# Patient Record
Sex: MALE | Race: WHITE | NOT HISPANIC OR LATINO | Employment: UNEMPLOYED | URBAN - METROPOLITAN AREA
[De-identification: names, ages, dates, MRNs, and addresses within clinical notes are randomized per-mention and may not be internally consistent; named-entity substitution may affect disease eponyms.]

---

## 2018-08-09 ENCOUNTER — APPOINTMENT (OUTPATIENT)
Dept: RADIOLOGY | Facility: MEDICAL CENTER | Age: 38
End: 2018-08-09
Attending: EMERGENCY MEDICINE
Payer: MEDICAID

## 2018-08-09 ENCOUNTER — HOSPITAL ENCOUNTER (EMERGENCY)
Facility: MEDICAL CENTER | Age: 38
End: 2018-08-09
Attending: EMERGENCY MEDICINE
Payer: MEDICAID

## 2018-08-09 VITALS
BODY MASS INDEX: 25.01 KG/M2 | OXYGEN SATURATION: 96 % | DIASTOLIC BLOOD PRESSURE: 91 MMHG | HEIGHT: 66 IN | TEMPERATURE: 97.6 F | SYSTOLIC BLOOD PRESSURE: 132 MMHG | HEART RATE: 94 BPM | RESPIRATION RATE: 18 BRPM | WEIGHT: 155.65 LBS

## 2018-08-09 DIAGNOSIS — L03.012 CELLULITIS OF LEFT INDEX FINGER: ICD-10-CM

## 2018-08-09 PROCEDURE — 73140 X-RAY EXAM OF FINGER(S): CPT | Mod: LT

## 2018-08-09 PROCEDURE — 304217 HCHG IRRIGATION SYSTEM

## 2018-08-09 PROCEDURE — 700111 HCHG RX REV CODE 636 W/ 250 OVERRIDE (IP): Performed by: EMERGENCY MEDICINE

## 2018-08-09 PROCEDURE — A9270 NON-COVERED ITEM OR SERVICE: HCPCS | Performed by: EMERGENCY MEDICINE

## 2018-08-09 PROCEDURE — 90471 IMMUNIZATION ADMIN: CPT

## 2018-08-09 PROCEDURE — 99284 EMERGENCY DEPT VISIT MOD MDM: CPT

## 2018-08-09 PROCEDURE — 700102 HCHG RX REV CODE 250 W/ 637 OVERRIDE(OP): Performed by: EMERGENCY MEDICINE

## 2018-08-09 PROCEDURE — 90715 TDAP VACCINE 7 YRS/> IM: CPT | Performed by: EMERGENCY MEDICINE

## 2018-08-09 RX ORDER — IBUPROFEN 600 MG/1
600 TABLET ORAL ONCE
Status: COMPLETED | OUTPATIENT
Start: 2018-08-09 | End: 2018-08-09

## 2018-08-09 RX ORDER — CEPHALEXIN 500 MG/1
500 CAPSULE ORAL ONCE
Status: COMPLETED | OUTPATIENT
Start: 2018-08-09 | End: 2018-08-09

## 2018-08-09 RX ORDER — NAPROXEN SODIUM 550 MG/1
550 TABLET ORAL 2 TIMES DAILY WITH MEALS
Qty: 20 TAB | Refills: 0 | Status: SHIPPED | OUTPATIENT
Start: 2018-08-09 | End: 2019-01-03

## 2018-08-09 RX ORDER — CEPHALEXIN 500 MG/1
500 CAPSULE ORAL 4 TIMES DAILY
Qty: 40 CAP | Refills: 0 | Status: SHIPPED | OUTPATIENT
Start: 2018-08-09 | End: 2019-01-03

## 2018-08-09 RX ORDER — SULFAMETHOXAZOLE AND TRIMETHOPRIM 800; 160 MG/1; MG/1
1 TABLET ORAL 2 TIMES DAILY
Qty: 20 TAB | Refills: 0 | Status: SHIPPED | OUTPATIENT
Start: 2018-08-09 | End: 2019-01-03

## 2018-08-09 RX ORDER — SULFAMETHOXAZOLE AND TRIMETHOPRIM 800; 160 MG/1; MG/1
1 TABLET ORAL ONCE
Status: COMPLETED | OUTPATIENT
Start: 2018-08-09 | End: 2018-08-09

## 2018-08-09 RX ORDER — HYDROCODONE BITARTRATE AND ACETAMINOPHEN 5; 325 MG/1; MG/1
1 TABLET ORAL ONCE
Status: COMPLETED | OUTPATIENT
Start: 2018-08-09 | End: 2018-08-09

## 2018-08-09 RX ADMIN — IBUPROFEN 600 MG: 600 TABLET, FILM COATED ORAL at 21:29

## 2018-08-09 RX ADMIN — SULFAMETHOXAZOLE AND TRIMETHOPRIM 1 TABLET: 800; 160 TABLET ORAL at 21:11

## 2018-08-09 RX ADMIN — CEPHALEXIN 500 MG: 500 CAPSULE ORAL at 21:11

## 2018-08-09 RX ADMIN — CLOSTRIDIUM TETANI TOXOID ANTIGEN (FORMALDEHYDE INACTIVATED), CORYNEBACTERIUM DIPHTHERIAE TOXOID ANTIGEN (FORMALDEHYDE INACTIVATED), BORDETELLA PERTUSSIS TOXOID ANTIGEN (GLUTARALDEHYDE INACTIVATED), BORDETELLA PERTUSSIS FILAMENTOUS HEMAGGLUTININ ANTIGEN (FORMALDEHYDE INACTIVATED), BORDETELLA PERTUSSIS PERTACTIN ANTIGEN, AND BORDETELLA PERTUSSIS FIMBRIAE 2/3 ANTIGEN 0.5 ML: 5; 2; 2.5; 5; 3; 5 INJECTION, SUSPENSION INTRAMUSCULAR at 21:12

## 2018-08-09 RX ADMIN — HYDROCODONE BITARTRATE AND ACETAMINOPHEN 1 TABLET: 5; 325 TABLET ORAL at 21:29

## 2018-08-09 ASSESSMENT — LIFESTYLE VARIABLES: DO YOU DRINK ALCOHOL: NO

## 2018-08-10 NOTE — DISCHARGE INSTRUCTIONS
Cellulitis, Adult  Introduction  Cellulitis is a skin infection. The infected area is usually red and sore. This condition occurs most often in the arms and lower legs. It is very important to get treated for this condition.  Follow these instructions at home:  · Take over-the-counter and prescription medicines only as told by your doctor.  · If you were prescribed an antibiotic medicine, take it as told by your doctor. Do not stop taking the antibiotic even if you start to feel better.  · Drink enough fluid to keep your pee (urine) clear or pale yellow.  · Do not touch or rub the infected area.  · Raise (elevate) the infected area above the level of your heart while you are sitting or lying down.  · Place warm or cold wet cloths (warm or cold compresses) on the infected area. Do this as told by your doctor.  · Keep all follow-up visits as told by your doctor. This is important. These visits let your doctor make sure your infection is not getting worse.  Contact a doctor if:  · You have a fever.  · Your symptoms do not get better after 1-2 days of treatment.  · Your bone or joint under the infected area starts to hurt after the skin has healed.  · Your infection comes back. This can happen in the same area or another area.  · You have a swollen bump in the infected area.  · You have new symptoms.  · You feel ill and also have muscle aches and pains.  Get help right away if:  · Your symptoms get worse.  · You feel very sleepy.  · You throw up (vomit) or have watery poop (diarrhea) for a long time.  · There are red streaks coming from the infected area.  · Your red area gets larger.  · Your red area turns darker.  This information is not intended to replace advice given to you by your health care provider. Make sure you discuss any questions you have with your health care provider.  Document Released: 06/05/2009 Document Revised: 05/25/2017 Document Reviewed: 10/26/2016  © 2017 Elsevier      Cellulitis,  Adult  Introduction  Cellulitis is a skin infection. The infected area is usually red and sore. This condition occurs most often in the arms and lower legs. It is very important to get treated for this condition.  Follow these instructions at home:  · Take over-the-counter and prescription medicines only as told by your doctor.  · If you were prescribed an antibiotic medicine, take it as told by your doctor. Do not stop taking the antibiotic even if you start to feel better.  · Drink enough fluid to keep your pee (urine) clear or pale yellow.  · Do not touch or rub the infected area.  · Raise (elevate) the infected area above the level of your heart while you are sitting or lying down.  · Place warm or cold wet cloths (warm or cold compresses) on the infected area. Do this as told by your doctor.  · Keep all follow-up visits as told by your doctor. This is important. These visits let your doctor make sure your infection is not getting worse.  Contact a doctor if:  · You have a fever.  · Your symptoms do not get better after 1-2 days of treatment.  · Your bone or joint under the infected area starts to hurt after the skin has healed.  · Your infection comes back. This can happen in the same area or another area.  · You have a swollen bump in the infected area.  · You have new symptoms.  · You feel ill and also have muscle aches and pains.  Get help right away if:  · Your symptoms get worse.  · You feel very sleepy.  · You throw up (vomit) or have watery poop (diarrhea) for a long time.  · There are red streaks coming from the infected area.  · Your red area gets larger.  · Your red area turns darker.  This information is not intended to replace advice given to you by your health care provider. Make sure you discuss any questions you have with your health care provider.  Document Released: 06/05/2009 Document Revised: 05/25/2017 Document Reviewed: 10/26/2016  © 2017 Elsevier

## 2018-08-10 NOTE — ED NOTES
Pt given d/c instructions/prescription/follow up and home care instructions, pt given 3 Rx. Pt verbalized understanding of POC, pt ambulated to ER lobby, steady gait.

## 2018-08-10 NOTE — ED PROVIDER NOTES
"ED Provider Note    CHIEF COMPLAINT   Chief Complaint   Patient presents with   • Hand Pain   • Hand Laceration       HPI   Phil Dhaliwal is a 38 y.o. male who presents to the emergency room today with complaints of laceration left index finger.  Patient states he cut his left index finger on a glass bulb 2 days ago.  He was doing fine until today at work started swelling up and his brother placed superglue over the wound.  Now it has increased swelling and redness to the area of tenderness to palpation or movement.  He has no foreign body sensation no fever chills.  He is right-hand dominan.      t  REVIEW OF SYSTEMS   See HPI for further details. All other systems are negative.     PAST MEDICAL HISTORY   Past Medical History:   Diagnosis Date   • ITP (idiopathic thrombocytopenic purpura)        FAMILY HISTORY  History reviewed. No pertinent family history.    SOCIAL HISTORY  Social History     Social History   • Marital status: Single     Spouse name: N/A   • Number of children: N/A   • Years of education: N/A     Social History Main Topics   • Smoking status: Current Every Day Smoker     Packs/day: 0.10   • Smokeless tobacco: Never Used   • Alcohol use Yes      Comment: occassionally    • Drug use: Yes     Types: Inhaled, Oral      Comment: marijuana   • Sexual activity: Not on file     Other Topics Concern   • Not on file     Social History Narrative   • No narrative on file       SURGICAL HISTORY  Past Surgical History:   Procedure Laterality Date   • OTHER ABDOMINAL SURGERY      spleen       CURRENT MEDICATIONS   Home Medications     Reviewed by Carlton Bustos R.N. (Registered Nurse) on 08/09/18 at 2044  Med List Status: Complete   Medication Last Dose Status        Patient Greg Taking any Medications                       ALLERGIES   No Known Allergies    PHYSICAL EXAM  VITAL SIGNS: /91   Pulse 94   Temp 36.4 °C (97.6 °F) (Temporal)   Resp 18   Ht 1.676 m (5' 6\")   Wt 70.6 kg (155 lb 10.3 " oz)   SpO2 96%   BMI 25.12 kg/m²  Room air O2: 96    Constitutional: Well developed, Well nourished, No acute distress, Non-toxic appearance.   Cardiovascular: Normal heart rate, Normal rhythm, No murmurs, No rubs, No gallops.   Thorax & Lungs: Normal breath sounds, No respiratory distress, No wheezing, No chest tenderness.   Abdomen: Bowel sounds normal, Soft, No tenderness, No masses, No pulsatile masses.   Skin: Warm, Dry, No erythema, No rash.  Recent laceration over the PIP joint on the volar surface left index finger there is surrounding erythema about 1 cm and edema to the area.  Extremities: Intact distal pulses, No cyanosis, No clubbing.   Musculoskeletal: Patient can flex the tip of his finger but very limited movement and also flexes at the MCP joint.  Limited range of motion of the left index finger secondary to the pain.  At this time does not appear to be tenosynovitis although there is some cellulitic type changes there has been noted with the redness and edema.  Neurologic: Alert & oriented x 3, Normal motor function, Normal sensory function, No focal deficits noted.     RADIOLOGY/PROCEDURES  DX-FINGER(S) 2+ LEFT   Final Result      No evidence of acute fracture or dislocation.      At least 3 tiny densities within the soft tissues along the radial and volar aspect of the third digit at the level of the proximal interphalangeal joint.      Soft tissue swelling of the third digit.               COURSE & MEDICAL DECISION MAKING  Pertinent Labs & Imaging studies reviewed. (See chart for details)  Discussed with hand specialist Dr. Bowles this is before x-rays obtained I did not see any evidence of foreign body in the x-ray.  The wound has already closed off and super glued over this.  Apparently there is some densities noted.  Patient to follow-up at the hand specialist at [facility] tomorrow for concern over swelling and edema and cellulitis to the area.  We attempted to soak and  irrigated the area to remove the superglue patient placed on Bactrim/Keflex.  He was told to return if increased swelling, discharge, fever or worsening symptoms and I described this to him he verbalizes the above instructions and need for follow-up as described above    FINAL IMPRESSION  1.  Acute cellulitis left index finger  2.   3.      Electronically signed by: Rupert Hernandez, 8/9/2018 10:07 PM

## 2018-08-10 NOTE — ED TRIAGE NOTES
Pt states he grabbed a light bulb the other day and now has left hand pain and a laceration to left middle finger, his friend put super glue in the cut. Pt does not remember the last time he received a tetanus shot. No redness noted but has swelling to the middle finger.

## 2019-01-03 ENCOUNTER — HOSPITAL ENCOUNTER (EMERGENCY)
Facility: MEDICAL CENTER | Age: 39
End: 2019-01-03
Attending: EMERGENCY MEDICINE
Payer: MEDICAID

## 2019-01-03 VITALS
DIASTOLIC BLOOD PRESSURE: 70 MMHG | WEIGHT: 136.24 LBS | TEMPERATURE: 97.2 F | RESPIRATION RATE: 16 BRPM | OXYGEN SATURATION: 95 % | HEART RATE: 88 BPM | BODY MASS INDEX: 21.99 KG/M2 | SYSTOLIC BLOOD PRESSURE: 108 MMHG

## 2019-01-03 DIAGNOSIS — F15.10 METHAMPHETAMINE ABUSE (HCC): ICD-10-CM

## 2019-01-03 LAB — EKG IMPRESSION: NORMAL

## 2019-01-03 PROCEDURE — 99284 EMERGENCY DEPT VISIT MOD MDM: CPT

## 2019-01-03 PROCEDURE — 93005 ELECTROCARDIOGRAM TRACING: CPT | Performed by: EMERGENCY MEDICINE

## 2019-01-03 PROCEDURE — 93005 ELECTROCARDIOGRAM TRACING: CPT

## 2019-01-03 NOTE — ED TRIAGE NOTES
"Wheeled to triage  Chief Complaint   Patient presents with   • Drug Abuse     \"I just don't feel good,\" vomited once in the triage room.  Pt said he's been doing meth all day every day for the last week or so.  "

## 2019-01-03 NOTE — ED PROVIDER NOTES
ED Provider Note    Scribed for Colten Tinsley M.D. by Lokesh Horn. 1/3/2019  12:35 PM    Primary care provider: Pcp Pt States None  Means of arrival: walk in  History obtained from: Patient  History limited by: None    CHIEF COMPLAINT  Chief Complaint   Patient presents with   • Drug Abuse   • N/V       HPI  Phil Dhaliwal is a 38 y.o. male who presents to the Emergency Department complaining of sudden nausea, vomiting starting today. Patient reports associated shortness of breath. He reports that he is visiting from [place]. Patient states that he has a history of methamphetamine use and has been using an abnormal amount this whole week.  He reports that his last methamphetamine use was 1.5 hours ago. Patient presents to the ED for evaluation of his symptoms and rule out of emergent processes. He denies fever.     REVIEW OF SYSTEMS  Pertinent negatives include no fever. As above, all other systems reviewed and are negative.   See HPI for further details.     PAST MEDICAL HISTORY   has a past medical history of ITP (idiopathic thrombocytopenic purpura).    SURGICAL HISTORY   has a past surgical history that includes other abdominal surgery.    SOCIAL HISTORY  Social History   Substance Use Topics   • Smoking status: Current Every Day Smoker     Packs/day: 1.00     Types: Cigarettes   • Smokeless tobacco: Never Used   • Alcohol use No      Comment: occassionally       History   Drug Use   • Types: Inhaled, Oral     Comment: meth       FAMILY HISTORY  History reviewed. No pertinent family history.    CURRENT MEDICATIONS  Home Medications     Reviewed by Priscila Michael R.N. (Registered Nurse) on 01/03/19 at 1200  Med List Status: Complete   Medication Last Dose Status        Patient Greg Taking any Medications                       ALLERGIES  No Known Allergies    PHYSICAL EXAM  VITAL SIGNS: /65   Pulse (!) 114   Temp 36.2 °C (97.2 °F) (Temporal)   Resp (!) 22   Wt 61.8 kg (136  lb 3.9 oz)   SpO2 97%   BMI 21.99 kg/m²     Constitutional: Well developed, Well nourished, No acute distress, Non-toxic appearance. Sleepy  HENT: Normocephalic, Atraumatic, Bilateral external ears normal, Oropharynx is clear mucous membranes are moist. No oral exudates or nasal discharge.   Eyes: Pupils are equal round and reactive, EOMI, Conjunctiva normal, No discharge.   Neck: Normal range of motion, No tenderness, Supple, No stridor. No meningismus.  Lymphatic: No lymphadenopathy noted.   Cardiovascular: Regular rate and rhythm without murmur rub or gallop.  Thorax & Lungs: Clear breath sounds bilaterally without wheezes, rhonchi or rales. There is no chest wall tenderness.   Abdomen: Soft non-tender non-distended. There is no rebound or guarding. No organomegaly is appreciated. Bowel sounds are normal.  Skin: Normal without rash.   Back: No CVA or spinal tenderness.   Extremities: Intact distal pulses, No edema, No tenderness, No cyanosis, No clubbing. Capillary refill is less than 2 seconds.  Musculoskeletal: Good range of motion in all major joints. No tenderness to palpation or major deformities noted.   Neurologic: Alert & oriented x 3, Normal motor function, Normal sensory function, No focal deficits noted. Reflexes are normal.  Psychiatric: Affect normal, Judgment normal, Mood normal. There is no suicidal ideation or patient reported hallucinations.     DIAGNOSTIC STUDIES / PROCEDURES    EKG Interpretation:  Results for orders placed or performed during the hospital encounter of 19   EKG   Result Value Ref Range    Report       [facility] Emergency Dept.    Test Date:  2019  Pt Name:    ERIKA KITCHEN         Department: ER  MRN:        [id number]                      Room:        29  Gender:     Male                         Technician: [id number]  :        1980                   Requested By:ER TRIAGE PROTOCOL  Order #:    [id number]                    Reading  MD:    Measurements  Intervals                                Axis  Rate:       71                           P:          0  OH:         167                          QRS:        90  QRSD:       100                          T:          33  QT:         452  QTc:        492    Interpretive Statements  SINUS RHYTHM  BORDERLINE RIGHT AXIS DEVIATION  BORDERLINE PROLONGED QT INTERVAL  No previous ECG available for comparison         COURSE & MEDICAL DECISION MAKING  Nursing notes, VS, PMSFHx reviewed in chart.    12:35 PM - Patient seen and examined at bedside.  He has some tachycardia but just use methamphetamine 1.5 hours ago.  He is sleepy and likely has exhausted his adrenal glands.  He seems to have a stable blood pressure and I believe he needs to have a significant resting.  Without drug ingestion to improve.  He is stable for discharge. Patient is instructed to return with any new or worsening symptoms. Discharge Vitals: /65   Pulse (!) 114   Temp 36.2 °C (97.2 °F) (Temporal)   Resp (!) 22   Wt 61.8 kg (136 lb 3.9 oz)   SpO2 97%   BMI 21.99 kg/m²     The patient will return for new or worsening symptoms and is stable at the time of discharge.    DISPOSITION:  Patient will be discharged home in stable condition.    FINAL IMPRESSION  1. Methamphetamine abuse (HCC)          Lokesh KHAN (Scribe), am scribing for, and in the presence of, Colten Tinsley M.D..    Electronically signed by: [place] Horn (Scribe), 1/3/2019    Colten KHAN M.D. personally performed the services described in this documentation, as scribed by Lokesh Horn in my presence, and it is both accurate and complete. E    The note accurately reflects work and decisions made by me.  Colten Tinsley  1/3/2019  3:46 PM

## 2019-01-03 NOTE — ED NOTES
Patient states he has been doing meth for the last week, all day, every day, last use was one hour ago.  Denies SI/HI.  States he does not feel good.  Denies CP, no major symptoms otherwise.  Vitals stable.  EKG complete.  Patient is tearful, restless, anxious.

## 2019-07-08 ENCOUNTER — HOSPITAL ENCOUNTER (EMERGENCY)
Facility: MEDICAL CENTER | Age: 39
End: 2019-07-09
Attending: EMERGENCY MEDICINE
Payer: MEDICAID

## 2019-07-08 DIAGNOSIS — H66.90 ACUTE OTITIS MEDIA, UNSPECIFIED OTITIS MEDIA TYPE: ICD-10-CM

## 2019-07-08 DIAGNOSIS — R30.0 DYSURIA: ICD-10-CM

## 2019-07-08 DIAGNOSIS — R05.9 COUGH: ICD-10-CM

## 2019-07-09 ENCOUNTER — APPOINTMENT (OUTPATIENT)
Dept: RADIOLOGY | Facility: MEDICAL CENTER | Age: 39
End: 2019-07-09
Attending: EMERGENCY MEDICINE
Payer: MEDICAID

## 2019-07-09 VITALS
WEIGHT: 150 LBS | RESPIRATION RATE: 18 BRPM | OXYGEN SATURATION: 98 % | DIASTOLIC BLOOD PRESSURE: 77 MMHG | TEMPERATURE: 97.2 F | SYSTOLIC BLOOD PRESSURE: 113 MMHG | HEART RATE: 69 BPM | HEIGHT: 66 IN | BODY MASS INDEX: 24.11 KG/M2

## 2019-07-09 LAB
ALBUMIN SERPL BCP-MCNC: 4 G/DL (ref 3.2–4.9)
ALBUMIN/GLOB SERPL: 1.6 G/DL
ALP SERPL-CCNC: 46 U/L (ref 30–99)
ALT SERPL-CCNC: 19 U/L (ref 2–50)
ANION GAP SERPL CALC-SCNC: 9 MMOL/L (ref 0–11.9)
AST SERPL-CCNC: 20 U/L (ref 12–45)
BILIRUB SERPL-MCNC: 0.6 MG/DL (ref 0.1–1.5)
BLOOD CULTURE HOLD CXBCH: NORMAL
BUN SERPL-MCNC: 15 MG/DL (ref 8–22)
CALCIUM SERPL-MCNC: 9.3 MG/DL (ref 8.5–10.5)
CHLORIDE SERPL-SCNC: 109 MMOL/L (ref 96–112)
CO2 SERPL-SCNC: 25 MMOL/L (ref 20–33)
CREAT SERPL-MCNC: 0.82 MG/DL (ref 0.5–1.4)
ERYTHROCYTE [DISTWIDTH] IN BLOOD BY AUTOMATED COUNT: 44.2 FL (ref 35.9–50)
GLOBULIN SER CALC-MCNC: 2.5 G/DL (ref 1.9–3.5)
GLUCOSE SERPL-MCNC: 98 MG/DL (ref 65–99)
HCT VFR BLD AUTO: 41.2 % (ref 42–52)
HGB BLD-MCNC: 14.2 G/DL (ref 14–18)
HIV 1+2 AB+HIV1 P24 AG SERPL QL IA: NON REACTIVE
LIPASE SERPL-CCNC: 18 U/L (ref 11–82)
MCH RBC QN AUTO: 32.1 PG (ref 27–33)
MCHC RBC AUTO-ENTMCNC: 34.5 G/DL (ref 33.7–35.3)
MCV RBC AUTO: 93 FL (ref 81.4–97.8)
PLATELET # BLD AUTO: 316 K/UL (ref 164–446)
PMV BLD AUTO: 10 FL (ref 9–12.9)
POTASSIUM SERPL-SCNC: 3.7 MMOL/L (ref 3.6–5.5)
PROT SERPL-MCNC: 6.5 G/DL (ref 6–8.2)
RBC # BLD AUTO: 4.43 M/UL (ref 4.7–6.1)
SODIUM SERPL-SCNC: 143 MMOL/L (ref 135–145)
TROPONIN I SERPL-MCNC: <0.01 NG/ML (ref 0–0.04)
WBC # BLD AUTO: 12 K/UL (ref 4.8–10.8)

## 2019-07-09 PROCEDURE — 71045 X-RAY EXAM CHEST 1 VIEW: CPT

## 2019-07-09 PROCEDURE — 85027 COMPLETE CBC AUTOMATED: CPT

## 2019-07-09 PROCEDURE — 83690 ASSAY OF LIPASE: CPT

## 2019-07-09 PROCEDURE — A9270 NON-COVERED ITEM OR SERVICE: HCPCS | Performed by: EMERGENCY MEDICINE

## 2019-07-09 PROCEDURE — 93005 ELECTROCARDIOGRAM TRACING: CPT | Performed by: EMERGENCY MEDICINE

## 2019-07-09 PROCEDURE — 80053 COMPREHEN METABOLIC PANEL: CPT

## 2019-07-09 PROCEDURE — 84484 ASSAY OF TROPONIN QUANT: CPT

## 2019-07-09 PROCEDURE — 99284 EMERGENCY DEPT VISIT MOD MDM: CPT

## 2019-07-09 PROCEDURE — 700102 HCHG RX REV CODE 250 W/ 637 OVERRIDE(OP): Performed by: EMERGENCY MEDICINE

## 2019-07-09 PROCEDURE — 87535 HIV-1 PROBE&REVERSE TRNSCRPJ: CPT | Mod: XU

## 2019-07-09 PROCEDURE — 87389 HIV-1 AG W/HIV-1&-2 AB AG IA: CPT

## 2019-07-09 RX ORDER — AMOXICILLIN AND CLAVULANATE POTASSIUM 875; 125 MG/1; MG/1
1 TABLET, FILM COATED ORAL ONCE
Status: COMPLETED | OUTPATIENT
Start: 2019-07-09 | End: 2019-07-09

## 2019-07-09 RX ORDER — AMOXICILLIN AND CLAVULANATE POTASSIUM 875; 125 MG/1; MG/1
1 TABLET, FILM COATED ORAL 2 TIMES DAILY
Qty: 20 TAB | Refills: 0 | Status: SHIPPED | OUTPATIENT
Start: 2019-07-09 | End: 2019-07-19

## 2019-07-09 RX ORDER — LORAZEPAM 1 MG/1
1 TABLET ORAL ONCE
Status: COMPLETED | OUTPATIENT
Start: 2019-07-09 | End: 2019-07-09

## 2019-07-09 RX ADMIN — LORAZEPAM 1 MG: 1 TABLET ORAL at 00:44

## 2019-07-09 RX ADMIN — AMOXICILLIN AND CLAVULANATE POTASSIUM 1 TABLET: 875; 125 TABLET, FILM COATED ORAL at 00:30

## 2019-07-09 NOTE — ED NOTES
Break RN: Pt discharged home. Assessment complete. Pt ambulates self. VS stable. Pt verbalized understanding discharge instructions. Prescriptions given pt verbalizes teaching provided.

## 2019-07-09 NOTE — ED TRIAGE NOTES
Pt to triage with multiple c/o ear pains/cysts/detox from etoh/meth x 2 weeks/anxiety, denies SI/HI, denies fever, pt appears anxious/nervous in triage but pleasant, aox4, resp even/unlabored

## 2019-07-09 NOTE — ED PROVIDER NOTES
ED Provider Note    Scribed for Ramon Boles M.D. by Noe Oquendo. 7/9/2019  12:14 AM    Primary care provider: Pcp Pt States None  Means of arrival: Walk in  History obtained from: Patient  History limited by: None    CHIEF COMPLAINT  Chief Complaint   Patient presents with   • Ear Pain     left   • Cyst     posterior scalp   • Detox     etoh/meth   • Anxiety       HPI  Phil Dhaliwal is a 39 y.o. male with a history of schizophrenia who presents to the Emergency Department for evaluation of bilateral ear pain that has been ongoing for approximately 6 months. He has noticed associated drainage from the right ear when laying down. The patient reports he has not recently spent a large amount of time with his head submerged under water. He has not attempted to seek medical attention regarding his ear pain and has not recently been treated with antibiotics. The patient has no associated fever at this time. He notes a history of ITP and states his platelets typically run around 9000. The patient admits to alcohol use and reports he smokes methamphetamine.     The patient additionally has complaints of multiple cysts to the back of his head. He became concerned after he shaved his head today and noticed that the cysts have grown in size. The patient reports no drainage from the cysts.     He also has complaints of intermittent shortness of breath. The patient notes associated lightheadedness and has experienced some near syncopal episodes.      REVIEW OF SYSTEMS  Pertinent positives include: bilateral ear pain, cysts to back of head, shortness of breath, lightheadedness. Pertinent negatives include: fever, drainage from cysts, or abdominal pain. See history of present illness. All other systems are negative.     PAST MEDICAL HISTORY   has a past medical history of ITP (idiopathic thrombocytopenic purpura).    SURGICAL HISTORY   has a past surgical history that includes other abdominal surgery.    SOCIAL  "HISTORY  Social History   Substance Use Topics   • Smoking status: Current Every Day Smoker     Packs/day: 1.00     Types: Cigarettes   • Smokeless tobacco: Never Used   • Alcohol use No      Comment: occassionally       History   Drug Use   • Types: Inhaled, Oral     Comment: meth       FAMILY HISTORY  History reviewed. No pertinent family history.    CURRENT MEDICATIONS  Home Medications     Reviewed by John Hassan R.N. (Registered Nurse) on 07/08/19 at 2151  Med List Status: Partial   Medication Last Dose Status        Patient Greg Taking any Medications                       ALLERGIES  No Known Allergies    PHYSICAL EXAM  VITAL SIGNS: /80   Pulse 97   Temp 36.2 °C (97.2 °F) (Temporal)   Resp 18   Ht 1.676 m (5' 6\")   Wt 68 kg (150 lb)   SpO2 97%   BMI 24.21 kg/m²     Constitutional: Alert in no apparent distress.  HENT: Numerous nodules present on posterior scalp, no surrounding erythema or fluctuance, no tenderness to palpation. No signs of trauma, Nose normal. Uvula midline. Right greater than left tympanic membrane erythema.   Eyes: Pupils are equal and reactive, Conjunctiva normal, Non-icteric.   Neck: Normal range of motion, No tenderness, Supple, No stridor.   Cardiovascular: Regular rate and rhythm, no murmurs.   Thorax & Lungs: Normal breath sounds, No respiratory distress, No wheezing, No chest tenderness.   Abdomen:  Soft, No tenderness, No peritoneal signs, No masses, No pulsatile masses.   Skin: Warm, Dry, No erythema, No rash.   Back: No bony tenderness, No CVA tenderness.   Extremities: Intact distal pulses, No edema, No tenderness, No cyanosis.  Musculoskeletal: Good range of motion in all major joints. No tenderness to palpation or major deformities noted.   Neurologic: Alert , Normal motor function, Normal sensory function, No focal deficits noted.   Psychiatric: Affect normal, Judgment normal, Mood normal.     DIAGNOSTIC STUDIES / PROCEDURES    LABS  Labs Reviewed   CBC " WITHOUT DIFFERENTIAL - Abnormal; Notable for the following:        Result Value    WBC 12.0 (*)     RBC 4.43 (*)     Hematocrit 41.2 (*)     All other components within normal limits   COMP METABOLIC PANEL   HIV AG/AB COMBO ASSAY DIAGNOSTIC   HIV-1 (RNA AND DNA) BY PCR, QUAL   TROPONIN   LIPASE   ESTIMATED GFR   BLOOD CULTURE,HOLD      All labs reviewed by me.    EKG  12 Lead EKG interpreted by me to show:  Indication: Shortness of breath  Normal sinus rhythm  Rate 69  Axis: Normal  Intervals: Normal  Normal T waves  Normal ST segments  My impression of this EKG: No STEMI.     RADIOLOGY  DX-CHEST-PORTABLE (1 VIEW)   Final Result         1.  No acute cardiopulmonary disease.        The radiologist's interpretation of all radiological studies have been reviewed by me.    COURSE & MEDICAL DECISION MAKING  Nursing notes, VS, PMSFHx reviewed in chart.    39 y.o. male p/w chief complaint of bilateral ear pain, shortness of breath, dysuria, and cyst to back of head.    12:14 AM Patient seen and examined at bedside.      The differential diagnoses include but are not limited to:     When I walked into the patient's room he immediately stated he was going to walk around the emergency department right now.  I started asking the patient a couple questions about his ear pain, and then he opened up to me about a myriad of ongoing health problems.    #Otitis media  Given duration will elect to treat with antibiotics for 10 days  Patient encouraged to follow up with PCP at the end of course.     #Shortness of breath: Given intermittent symptoms for several months I feel like screening EKG chest x-ray and troponin are appropriate.  All unremarkable for acute pathology at this time however patient counseled needs to follow-up with regular doctor  EKG ordered  Troponin ordered  DX-Chest ordered  Patient PERC negative.    #Cyst  Scalp lesions identified on physical exam.  Patient instructed to follow up with PCP after course of  antibiotics.    Encourage patient to obtain sexual transmitted infection testing at regular doctor    Patient will be treated with Ativan 1 mg for anxiety and will be given first dose of Augmentin 875-125 mg. Ordered for DX-Chest (1 view), Troponin, HIV AG/AB Combo Assay,  to evaluate his symptoms.     1:44 AM - Patient was reevaluated at bedside. Discussed lab and radiology results with the patient that are reassuring. He will be discharged home with prescription for Amoxicillin to treat otitis media. The patient is instructed to follow up with PCP. He is understanding and agreeable to discharge.      The patient will return for new or worsening symptoms and is stable at the time of discharge.    DISPOSITION:  Patient will be discharged home in stable condition.    FOLLOW UP:  [facility], Emergency Dept  [address] [address] [phone]    If symptoms worsen or any other concerns    [address] [id number] [phone]  In 3 days  Please call to schedule close follow up this week      OUTPATIENT MEDICATIONS:  Discharge Medication List as of 7/9/2019  1:40 AM      START taking these medications    Details   amoxicillin-clavulanate (AUGMENTIN) 875-125 MG Tab Take 1 Tab by mouth 2 times a day for 10 days., Disp-20 Tab, R-0, Print Rx Paper             FINAL IMPRESSION  1. Cough    2. Acute otitis media, unspecified otitis media type    3. Dysuria          Noe KHAN), am scribing for, and in the presence of, Ramon Bolse M.D..    Electronically signed by: Noe Gonzalez), 7/9/2019    Ramon KHAN M.D. personally performed the services described in this documentation, as scribed by Noe Oquendo in my presence, and it is both accurate and complete.    C.    The note accurately reflects work and decisions made by me.  Ramon Boles  7/9/2019  5:37 AM

## 2019-07-09 NOTE — DISCHARGE INSTRUCTIONS
Please call to schedule [facility]-up appointment with [facility] or return with any new or different concerns

## 2019-07-10 ENCOUNTER — HOSPITAL ENCOUNTER (EMERGENCY)
Dept: HOSPITAL 8 - ED | Age: 39
End: 2019-07-10
Payer: MEDICAID

## 2019-07-10 VITALS — SYSTOLIC BLOOD PRESSURE: 122 MMHG | DIASTOLIC BLOOD PRESSURE: 78 MMHG

## 2019-07-10 VITALS — WEIGHT: 142.2 LBS | HEIGHT: 66 IN | BODY MASS INDEX: 22.85 KG/M2

## 2019-07-10 DIAGNOSIS — L72.3: Primary | ICD-10-CM

## 2019-07-10 LAB — EKG IMPRESSION: NORMAL

## 2019-07-10 PROCEDURE — 99281 EMR DPT VST MAYX REQ PHY/QHP: CPT

## 2019-07-11 ENCOUNTER — HOSPITAL ENCOUNTER (EMERGENCY)
Facility: MEDICAL CENTER | Age: 39
End: 2019-07-11
Attending: EMERGENCY MEDICINE
Payer: MEDICAID

## 2019-07-11 VITALS
OXYGEN SATURATION: 97 % | SYSTOLIC BLOOD PRESSURE: 121 MMHG | TEMPERATURE: 98.2 F | HEIGHT: 66 IN | DIASTOLIC BLOOD PRESSURE: 72 MMHG | WEIGHT: 140.21 LBS | HEART RATE: 74 BPM | BODY MASS INDEX: 22.53 KG/M2 | RESPIRATION RATE: 16 BRPM

## 2019-07-11 DIAGNOSIS — F19.10 POLYSUBSTANCE ABUSE (HCC): ICD-10-CM

## 2019-07-11 DIAGNOSIS — F10.10 ALCOHOL ABUSE: ICD-10-CM

## 2019-07-11 LAB — HIV 1 PRO DNA BLD QL NAA+PROBE: NORMAL

## 2019-07-11 PROCEDURE — 99284 EMERGENCY DEPT VISIT MOD MDM: CPT

## 2019-07-11 NOTE — ED NOTES
Pt received discharge instructions and detox resources and understood all including follow up, pt ambulated to lobby with no difficulty

## 2019-07-11 NOTE — ED PROVIDER NOTES
"ED Provider Note    Scribed for Hector Rodriguez M.D. by Vinod Smart. 7/11/2019, 12:10 PM.    Primary care provider: Pcp Pt States None  Means of arrival: walk-in  History obtained from: patient  History limited by: none    CHIEF COMPLAINT  Chief Complaint   Patient presents with   • Detox     last drink a couple days ago, last meth use a day ago, he thinks       HPI  Phil Dhaliwal is a 39 y.o. male who presents to the Emergency Department asking for help with detoxification. He states he went to the [facility] who recommended he present here before going to a detox facility. He mostly wants to detox from alcohol. He states he drinks about a fifth of vodka a day most of the times. He has not had any alcohol today. He is not sure how long he has been drinking; he states he has been \"for as long as I can remember\".  He reports he has not previously been to a detox facility. He admits to also using marijuana, heroin, IV methamphetamines; his last meth use was yesterday. Denies any associated symptoms.     REVIEW OF SYSTEMS  Review of Systems   All other systems reviewed and are negative.    PAST MEDICAL HISTORY   has a past medical history of ITP (idiopathic thrombocytopenic purpura).    SURGICAL HISTORY   has a past surgical history that includes other abdominal surgery.    SOCIAL HISTORY  Social History   Substance Use Topics   • Smoking status: Current Every Day Smoker     Packs/day: 1.00     Types: Cigarettes   • Smokeless tobacco: Never Used   • Alcohol use Yes      Comment: pint + daily      History   Drug Use   • Types: Inhaled, Oral     Comment: meth       FAMILY HISTORY  History reviewed. No pertinent family history.    CURRENT MEDICATIONS  Reviewed. See nurse's notes.     ALLERGIES  No Known Allergies    PHYSICAL EXAM  VITAL SIGNS: /76   Pulse 76   Temp 36.8 °C (98.2 °F) (Temporal)   Resp 16   Ht 1.676 m (5' 6\")   Wt 63.6 kg (140 lb 3.4 oz)   SpO2 97%   BMI 22.63 kg/m² "     Constitutional: Well developed, Well nourished, No acute distress, Non-toxic appearance.   HENT: Normocephalic, Atraumatic, oropharynx moist, No oral exudates, Nose normal.   Eyes:conjunctiva is normal, there are no signs of exudate.   Neck: Supple, no meningeal signs.   Cardiovascular: Regular rate and rhythm without murmurs gallops or rubs.   Thorax & Lungs: No respiratory distress. Breathing comfortably. Lungs are clear to auscultation bilaterally, there are no wheezes no rales. Chest wall is nontender.  Abdomen: Soft, nontender, nondistended. Bowel sounds are present.   Skin: Warm, Dry, No erythema,   Back: No tenderness, No CVA tenderness.    Psychiatric: Affect normal, Judgment normal, Mood normal.     COURSE & MEDICAL DECISION MAKING  Pertinent Labs & Imaging studies reviewed. (See chart for details)    12:10 PM - Patient seen and examined at bedside. Patient is asking for help with receiving detox care. Patient will be provided with information about rehabilitation facilities that will work with his Medicaid insurance. Patient willingly presented to the [facility] this morning to receive help with detoxification, and he continues to state that he would like help. Patient is medically clear and asymptomatic. He will be discharged after he is given information to contact the detox facility.      The patient will return for new or worsening symptoms and is stable at the time of discharge.    It was noticed that the patient's systolic blood pressure was greater than 120/80 on today's visit. At this point, most likely related to reactive hypertension, secondary to the emergency visit itself. I have recommend the patient followup with his primary care physician for recheck of his blood pressure.       DISPOSITION:  Patient will be discharged home in stable condition.    FOLLOW UP:  A Detox Facility      Your are medically cleared to attend a detox facility        FINAL IMPRESSION  1. Alcohol abuse    2.  Polysubstance abuse (HCC)          IVinod (Scribe), am scribing for, and in the presence of, Hector Rodriguez M.D..    Electronically signed by: Vinod Smart (Scribe), 7/11/2019    IHector M.D. personally performed the services described in this documentation, as scribed by Vinod Smart in my presence, and it is both accurate and complete. C    The note accurately reflects work and decisions made by me.  Hector Rodriguez  7/11/2019  6:49 PM

## 2019-07-11 NOTE — ED TRIAGE NOTES
"Chief Complaint   Patient presents with   • Detox     last drink a couple days ago, last meth use a day ago, he thinks     /76   Pulse 76   Temp 36.8 °C (98.2 °F) (Temporal)   Resp 16   Ht 1.676 m (5' 6\")   Wt 63.6 kg (140 lb 3.4 oz)   SpO2 97%     Pt ambulates with a steady gait to triage states the [facility] sent him here to detox. Pt gives very vague answers to questions stating he hardly remembers the last few years. Pt states he thinks he does meth, smokes weed cannot answer for certain. Pt states he drinks at least a pint of ETOH daily. Pt states he fell asleep, woke up and was told to come here per the [facility]. Pt does not present with shakes or tremors, denies N/V/D, does not appear to be in any distress.   "

## 2019-07-11 NOTE — DISCHARGE PLANNING
ALERT team BH note:  Per [facility] ERP, Dr. Rodriguez's request, writer RN reviewed community CD resources with pt, with written information given, including [facility] and [facility], Medicaid FFS insurance plan; pt verbalized understanding; no SI, HI, or self-harm ideation noted; writer RN updated Dr. Cruz